# Patient Record
(demographics unavailable — no encounter records)

---

## 2025-07-17 NOTE — ASSESSMENT
[FreeTextEntry1] : Exam and evaluation were performed.   Radiographs (3v of bilateral foot and ankle) were taken and reviewed to show no acute fracture or dislocation. The patient demonstrates pain associated with the left 2nd digit and right midfoot after a contusion type injury to bilateral foot. I discussed treatment options and prognosis with patient. Based on clinical exam and radiographic findings, treatments as above. A CAM walker was dispensed to aid in proper offloading of the right foot. Splinting and shoe gear modifications were discussed for the left foot. The patient was instructed to rest and ice instructed. Patient to keep CAM walker in place at all times and keep weightbearing to a strict minimum. Long term prognosis and anticipated recovery time was discussed. Crutches can be used for assistance if needed. She is able to be weight bearing with use of the CAM walker on the right foot.   All questions were answered to patient's satisfaction. PTR in 2 weeks for follow up and reassessment with likely repeat x-rays at that time.

## 2025-07-17 NOTE — HISTORY OF PRESENT ILLNESS
[FreeTextEntry1] : 27 year old female presents to the office for an initial visit with a chief complaint of bilateral foot pain. The patient status that she sustained an injury to bilateral foot when she fell down a set of stairs, missing a step and falling into a wall. This injury occurred on 7/11 with noticeably increased pain and swelling, and with an inability to weight bear normally the next day. She went to Urgent Care on 7/12 where x-rays were taken and she was referred for further management. She relates pain specifically to the left 2nd digit and the top of the right foot. She presents with a posterior splint intact to the RLE and a surgical shoe to the left foot and is currently ambulating with the assistance of crutches. She denies any history of foot or ankle issues in the past.

## 2025-07-17 NOTE — PHYSICAL EXAM
[General Appearance - Alert] : alert [2+] : left foot dorsalis pedis 2+ [Normal Foot/Ankle] : Both lower extremities were exposed and visualized. Standing exam demonstrates normal foot posture and alignment. Hindfoot exam shows no hindfoot valgus or varus [Skin Color & Pigmentation] : normal skin color and pigmentation [Sensation] : the sensory exam was normal to light touch and pinprick [Oriented To Time, Place, And Person] : oriented to person, place, and time [Ankle Swelling (On Exam)] : not present [Varicose Veins Of Lower Extremities] : not present [] : not present [Delayed in the Right Toes] : capillary refills normal in right toes [Delayed in the Left Toes] : capillary refills normal in the left toes [de-identified] : The left foot demonstrates mild to moderate pain around the left 2nd digit DIPJ area with localized bursitis. On the right foot, there is diffuse dorsomedial pain at the level of the midfoot region with no lisfranc tenderness. There is no lateral ankle pain noted, right ankle. There is guarding upon attempted right foot and ankle range of motion. [FreeTextEntry1] : There is medial ecchymosis noted to the right midfoot region and a superficial abrasion noted to the dorsa left 2nd digit with no signs of acute infection.